# Patient Record
Sex: MALE | Race: WHITE | NOT HISPANIC OR LATINO | Employment: OTHER | URBAN - METROPOLITAN AREA
[De-identification: names, ages, dates, MRNs, and addresses within clinical notes are randomized per-mention and may not be internally consistent; named-entity substitution may affect disease eponyms.]

---

## 2024-01-23 ENCOUNTER — TRANSCRIBE ORDERS (OUTPATIENT)
Dept: PHYSICAL THERAPY | Facility: CLINIC | Age: 69
End: 2024-01-23
Payer: MEDICARE

## 2024-01-23 DIAGNOSIS — S93.401A SPRAIN OF RIGHT ANKLE, INITIAL ENCOUNTER: Primary | ICD-10-CM

## 2024-01-26 ENCOUNTER — TREATMENT (OUTPATIENT)
Dept: PHYSICAL THERAPY | Facility: CLINIC | Age: 69
End: 2024-01-26
Payer: MEDICARE

## 2024-01-26 DIAGNOSIS — S93.401A SPRAIN OF RIGHT ANKLE, UNSPECIFIED LIGAMENT, INITIAL ENCOUNTER: Primary | ICD-10-CM

## 2024-01-26 DIAGNOSIS — R26.9 ABNORMAL GAIT: ICD-10-CM

## 2024-01-26 DIAGNOSIS — R26.2 DIFFICULTY WALKING: ICD-10-CM

## 2024-01-26 DIAGNOSIS — Z78.9 DIFFICULTY NAVIGATING STAIRS: ICD-10-CM

## 2024-01-26 DIAGNOSIS — R29.898 LOSS OF MOVEMENT: ICD-10-CM
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
## 2024-02-02 ENCOUNTER — TREATMENT (OUTPATIENT)
Dept: PHYSICAL THERAPY | Facility: CLINIC | Age: 69
End: 2024-02-02
Payer: MEDICARE

## 2024-02-02 DIAGNOSIS — R26.9 ABNORMAL GAIT: ICD-10-CM

## 2024-02-02 DIAGNOSIS — Z78.9 DIFFICULTY NAVIGATING STAIRS: ICD-10-CM

## 2024-02-02 DIAGNOSIS — S93.401A SPRAIN OF RIGHT ANKLE, UNSPECIFIED LIGAMENT, INITIAL ENCOUNTER: Primary | ICD-10-CM

## 2024-02-02 DIAGNOSIS — R26.2 DIFFICULTY WALKING: ICD-10-CM

## 2024-02-02 DIAGNOSIS — R29.898 LOSS OF MOVEMENT: ICD-10-CM

## 2024-02-02 NOTE — PROGRESS NOTES
Physical Therapy Daily Treatment Note    [facility]  [address] [place] [address]  [phone] (phone)  [phone] (fax)    Patient: Catrachito Winkler   : 1955  Diagnosis/ICD-10 Code:  Sprain of right ankle, unspecified ligament, initial encounter [S93.401A]  Referring practitioner: NGOZI Conley  Today's Date: 2024  Patient seen for 2 sessions    Visit Diagnoses:    ICD-10-CM ICD-9-CM   1. Sprain of right ankle, unspecified ligament, initial encounter  S93.401A 845.00   2. Difficulty walking  R26.2 719.7   3. Loss of movement  R29.898 344.9   4. Abnormal gait  R26.9 781.2   5. Difficulty navigating stairs  Z78.9 V49.89              Subjective: Hao reports that he no longer has pain with his first step with walking after he has sat.  He notices just a trace of pain in the R ankle at the end of the day if he has been active on his feet.     Objective     Treatment    Therapeutic exercise  NuStep, seat at 9, work load of 5 x 5 minutes  Matrix leg press, seat at 8, work load of 15, 15 x 2, ROM 5-45 degrees of knee flexion    3. Matrix hip abduction, 40 lbs., 15 x 2  4. Standing unilateral wobble board, plantarflexion/dorsiflexion x 20, B  After manual therapy  5. Ankle plantarflexion/dorsiflexion, long sitting, x 20, B  6. Gastrocnemius stretch, long sitting, 30s x 3, B  7. Ankle plantarflexion PRE, long sitting, x 20, B    NMR: verbal cues for exercise included using eccentric control for each exercise.  With the wobble board to keep the heel flat on the board during end range of each movement and to avoid locking his knees with the leg press.  With the ankle plantarflexion PRE, to avoid inversion/eversion moments.     Manual therapy:  Talocrural joint mobilizations, grade 4, x 40 on the R and x 20 on the L    Self care: I added resisted ankle plantarflexion to his HEP, issuing blue theraband.     Assessment & Plan       Assessment  Assessment  details: Hao tolerated treatment well.  He required verbal cues for exercise technique and progressed his HEP.     Plan  Plan details: Continue per treatmentplan.                Timed:    Manual Therapy:    5     mins  78276;  Therapeutic Exercise:    30     mins  31503;     Neuromuscular Freddy:    10    mins  77925;    Therapeutic Activity:          mins  27807;     Gait Training:           mins  75677;     Ultrasound:          mins  89291;    Aquatic Therapy         mins 54888;  Self Care                            mins   28496        Untimed:  Electrical Stimulation:         mins  93720 ( );  Traction:         mins  44025;   Dry Needling  (1-2 muscles)                 mins 20560 (Self-pay)  Dry Needling (3-4 muscles)      20561 (Self-pay)  Dry Needling Trial         DRYNDLTRIAL  (No Charge)    Timed Treatment:   45   mins   Total Treatment:     45   mins    Nirva Hong PT  Physical Therapist    [place] License:[id number]

## 2024-02-06 ENCOUNTER — TREATMENT (OUTPATIENT)
Dept: PHYSICAL THERAPY | Facility: CLINIC | Age: 69
End: 2024-02-06
Payer: MEDICARE

## 2024-02-06 DIAGNOSIS — S93.401A SPRAIN OF RIGHT ANKLE, UNSPECIFIED LIGAMENT, INITIAL ENCOUNTER: Primary | ICD-10-CM

## 2024-02-06 DIAGNOSIS — R26.2 DIFFICULTY WALKING: ICD-10-CM

## 2024-02-06 DIAGNOSIS — R26.9 ABNORMAL GAIT: ICD-10-CM

## 2024-02-06 DIAGNOSIS — R29.898 LOSS OF MOVEMENT: ICD-10-CM

## 2024-02-06 DIAGNOSIS — Z78.9 DIFFICULTY NAVIGATING STAIRS: ICD-10-CM

## 2024-02-06 NOTE — PROGRESS NOTES
Physical Therapy Daily Treatment Note    [facility] [place] [address]  [phone] (phone)  [phone] (fax)    Patient: Catrachito Winkler   : 1955  Diagnosis/ICD-10 Code:  Sprain of right ankle, unspecified ligament, initial encounter [S93.401A]  Referring practitioner: NGOZI Conley  Today's Date: 2024  Patient seen for 3 sessions    Visit Diagnoses:    ICD-10-CM ICD-9-CM   1. Sprain of right ankle, unspecified ligament, initial encounter  S93.401A 845.00   2. Difficulty walking  R26.2 719.7   3. Loss of movement  R29.898 344.9   4. Abnormal gait  R26.9 781.2   5. Difficulty navigating stairs  Z78.9 V49.89              Subjective: Hao reports no ankle pain in his ankles recently, but with motion the R ankle does pop.    Objective     Treatment  Therapeutic exercise  NuStep, seat at 9, work load of 6 x 5 minutes  Wobble board, unilateral, plantarflexion/dorsiflexion, x 20, each leg  Wobble board, unilateral, inversion/eversion, x 20, each leg  Resisted gait, reverse/forward, 12.5 lbs., x 10, with CGA-1  Matrix leg press, seat at 8, 60 and 65 lbs., each x 15  Matrix hip abduction, 42.5 lbs., 15 x 2  Ankle plantarflexion, PRE, long sitting, blue theraband, x 20, B  Gastrocnemius stretch, long sitting, using towel, 30s x 3, B    NMR: verbal cues for exercise included technique on the wobble boards, with the resisted gait to use a toe to heel technique in reverse, heel to toe technique going forward, on the Matrix machines to use eccentric control with each machine and with the press to avoid full knee extension and during flexion to abduct his hips.  I reviewed technique with the ankle plantarflexion PRE and the gastrocnemius stretch.    Assessment & Plan       Assessment  Assessment details: Hao progressed exercises with the resisted gait.  He needed cues for exercise technique and some guarding with the resisted gait.  Hao tolerated  treatment well.     Plan  Plan details: Continue per treatment plan.                Timed:    Manual Therapy:         mins  95289;  Therapeutic Exercise:    37     mins  40096;     Neuromuscular Freddy:    8    mins  19565;    Therapeutic Activity:          mins  84570;     Gait Training:           mins  14331;     Ultrasound:          mins  89662;    Aquatic Therapy         mins 21744;  Self Care                            mins   04725        Untimed:  Electrical Stimulation:         mins  34169 ( );  Traction:         mins  65374;   Dry Needling  (1-2 muscles)                 mins 20560 (Self-pay)  Dry Needling (3-4 muscles)      20561 (Self-pay)  Dry Needling Trial         DRYNDLTRIAL  (No Charge)    Timed Treatment:   45   mins   Total Treatment:     45   mins    Nirav Hong PT  Physical Therapist    [place] License:[id number]

## 2024-02-13 ENCOUNTER — TREATMENT (OUTPATIENT)
Dept: PHYSICAL THERAPY | Facility: CLINIC | Age: 69
End: 2024-02-13
Payer: MEDICARE

## 2024-02-13 DIAGNOSIS — R29.898 LOSS OF MOVEMENT: ICD-10-CM

## 2024-02-13 DIAGNOSIS — R26.2 DIFFICULTY WALKING: ICD-10-CM

## 2024-02-13 DIAGNOSIS — Z78.9 DIFFICULTY NAVIGATING STAIRS: ICD-10-CM

## 2024-02-13 DIAGNOSIS — R26.9 ABNORMAL GAIT: ICD-10-CM

## 2024-02-13 DIAGNOSIS — S93.401A SPRAIN OF RIGHT ANKLE, UNSPECIFIED LIGAMENT, INITIAL ENCOUNTER: Primary | ICD-10-CM

## 2024-02-15 ENCOUNTER — TREATMENT (OUTPATIENT)
Dept: PHYSICAL THERAPY | Facility: CLINIC | Age: 69
End: 2024-02-15
Payer: MEDICARE

## 2024-02-15 DIAGNOSIS — R26.2 DIFFICULTY WALKING: ICD-10-CM

## 2024-02-15 DIAGNOSIS — Z78.9 DIFFICULTY NAVIGATING STAIRS: ICD-10-CM

## 2024-02-15 DIAGNOSIS — S93.401A SPRAIN OF RIGHT ANKLE, UNSPECIFIED LIGAMENT, INITIAL ENCOUNTER: Primary | ICD-10-CM

## 2024-02-15 DIAGNOSIS — R29.898 LOSS OF MOVEMENT: ICD-10-CM

## 2024-02-15 DIAGNOSIS — R26.9 ABNORMAL GAIT: ICD-10-CM

## 2024-03-04 ENCOUNTER — TREATMENT (OUTPATIENT)
Dept: PHYSICAL THERAPY | Facility: CLINIC | Age: 69
End: 2024-03-04
Payer: MEDICARE

## 2024-03-04 DIAGNOSIS — R26.2 DIFFICULTY WALKING: ICD-10-CM

## 2024-03-04 DIAGNOSIS — R26.9 ABNORMAL GAIT: ICD-10-CM

## 2024-03-04 DIAGNOSIS — R29.898 LOSS OF MOVEMENT: ICD-10-CM

## 2024-03-04 DIAGNOSIS — Z78.9 DIFFICULTY NAVIGATING STAIRS: ICD-10-CM

## 2024-03-04 DIAGNOSIS — S93.401A SPRAIN OF RIGHT ANKLE, UNSPECIFIED LIGAMENT, INITIAL ENCOUNTER: Primary | ICD-10-CM

## 2024-03-04 NOTE — PROGRESS NOTES
"Physical Therapy Daily Treatment/Reassessment Note    [facility]  [address] [place] [address]  [phone] (phone)  [phone] (fax)    Patient: Catrachito Winkler   : 1955  Diagnosis/ICD-10 Code:  Sprain of right ankle, unspecified ligament, initial encounter [S93.401A]  Referring practitioner: NGOZI Conley  Today's Date: 3/4/2024  Patient seen for 6 sessions    Visit Diagnoses:    ICD-10-CM ICD-9-CM   1. Sprain of right ankle, unspecified ligament, initial encounter  S93.401A 845.00   2. Loss of movement  R29.898 344.9   3. Difficulty walking  R26.2 719.7   4. Difficulty navigating stairs  Z78.9 V49.89   5. Abnormal gait  R26.9 781.2              Subjective:  Hao stated that his R ankle has not been experiencing pain.  He has been doing little exercise while on vacation the last 10 days.     Objective     Reassessment    Supine AROM of the ankles  Dorsiflexion: L 3 degrees, R 8 degrees  Plantarflexion: L 35 degrees, R 34 degrees  Inversion: L  8 degrees, R 16 degrrees  Eversion: R 4 degrees, R 8 degrees    MMT  Dorsiflexion L 3-/5, R 5/5   2. Plantarflexion:  unable to do a single leg heel raise but able to do bilateral partial repetitions x 10    Treatment    Therapeutic exercise  NuStep, seat at 11, work load of 6, x 5 minutes  Ankle wobble board for plantarflexion/dorsiflexion x 20, B, holding onto cage with one hand  Matrix leg press, seat at 8, 70 lbs., 15 x 2  Runner's step, x 10, B, 4\", step using column for balance    NMR: verbal cues for each exercise were given.   Assessment & Plan       Assessment  Impairments: abnormal gait, activity intolerance, impaired physical strength and lacks appropriate home exercise program   Other impairment: previous L L5 nerve root injury due to surgery in past to the lumbar spine  Functional limitations: walking and stooping   Assessment details: STGs to be met in 4 weeks  1. Hao begins NuStep to promote " improve movement in the hips, knees and ankles.(Met)  2. PREs to improve ankle strength are begun using theraband. (Met)  3. Gait training, focusing on a heel to toe pattern, is begun. (Met)     LTGs to be met in 12 weeks  1. B ankle dorsiflexion increases 5 degrees.(Ongoing)  2. Hao is able to navigate stairs, using hand rail and SC, with a step through technique, independently. (Ongoing)  3. He is independent with a HEP and self care. (Ongoing)         Plan  Therapy options: will be seen for skilled therapy services  Planned therapy interventions: manual therapy, neuromuscular re-education, therapeutic activities, stretching, strengthening, flexibility, functional ROM exercises, gait training, home exercise program and joint mobilization  Frequency: 2x week  Duration in weeks: 4  Treatment plan discussed with: patient  Plan details: Continue per treatment plan getting him ready for independent self care.                Timed:    Manual Therapy:         mins  71513;  Therapeutic Exercise:    37     mins  51009;     Neuromuscular Freddy:    8    mins  09773;    Therapeutic Activity:          mins  45602;     Gait Training:           mins  35866;     Ultrasound:          mins  93909;    Aquatic Therapy         mins 98103;  Self Care                            mins   22549        Untimed:  Electrical Stimulation:         mins  27038 ( );  Traction:         mins  17678;   Dry Needling  (1-2 muscles)                 mins 20560 (Self-pay)  Dry Needling (3-4 muscles)      20561 (Self-pay)  Dry Needling Trial         DRYNDLTRIAL  (No Charge)    Timed Treatment:   45   mins   Total Treatment:     45   mins    Nirav Hong PT  Physical Therapist    [place] License:[id number]

## 2024-03-07 ENCOUNTER — TREATMENT (OUTPATIENT)
Dept: PHYSICAL THERAPY | Facility: CLINIC | Age: 69
End: 2024-03-07
Payer: MEDICARE

## 2024-03-07 DIAGNOSIS — R29.898 LOSS OF MOVEMENT: ICD-10-CM

## 2024-03-07 DIAGNOSIS — R26.9 ABNORMAL GAIT: ICD-10-CM

## 2024-03-07 DIAGNOSIS — Z78.9 DIFFICULTY NAVIGATING STAIRS: ICD-10-CM

## 2024-03-07 DIAGNOSIS — R26.2 DIFFICULTY WALKING: ICD-10-CM

## 2024-03-07 DIAGNOSIS — S93.401A SPRAIN OF RIGHT ANKLE, UNSPECIFIED LIGAMENT, INITIAL ENCOUNTER: Primary | ICD-10-CM

## 2024-03-07 NOTE — PROGRESS NOTES
"Physical Therapy Daily Treatment Note    [facility]  [address] [place] [address]  [phone] (phone)  [phone] (fax)    Patient: Catrachito Winkler   : 1955  Diagnosis/ICD-10 Code:  Sprain of right ankle, unspecified ligament, initial encounter [S93.401A]  Referring practitioner: NGOZI Conley  Today's Date: 3/7/2024  Patient seen for 7 sessions    Visit Diagnoses:    ICD-10-CM ICD-9-CM   1. Sprain of right ankle, unspecified ligament, initial encounter  S93.401A 845.00   2. Loss of movement  R29.898 344.9   3. Difficulty walking  R26.2 719.7   4. Difficulty navigating stairs  Z78.9 V49.89   5. Abnormal gait  R26.9 781.2              Subjective: Hao reported that he did well after last session and was able to do yard work afterwards.      Objective     Treatment  Therapeutic exercise  NuStep, seat at 9, work load of 6 x 5 minutes  Matrix leg press, seat at 8, 60 and 65 lbs., each x 15  Matrix hip abduction, 42.5 lbs., 15 x 2  Resisted gait, reverse/forward, 12.5 lbs., holding onto bar,x 10, with CGA-1  Runner's step, 4\", x 10, B, using column for balance assist  Lateral step down, 4\", x 10, B, using column for balance assist  Wobble board plantarflexion/dorsiflexion x 20, B  Wobble board inversion/eversion 10 x 2 and balancing in center for 10s between each set of 10, holding onto bar.  Seated leg curl, seat at 6, 20 lbs., 10 x 2  9. Seated ankle dorsiflexion, L x 20, yellow theraband, R x 20, red theraband    NMR:  verbal cues for the exercises were given.  For the leg press, to avoid full knee extension and slightly abduct the hips into knee flexion, to use eccentric control with the hip abduction, for the resisted gait, to use a toe to heel gait in reverse, while taking smaller more deliberate steps, and a heel to toe gait forward.  With the step exercises to stand tall, gaze to the ground ahead to increase proprioceptive feedback to the brain " and help balance and to use eccentric control with the static leg and hit the dynamic foot toe to heel.  cues for the leg curl to avoid full knee extension and use eccentric control for the return.     Assessment & Plan       Assessment  Assessment details: I was able to add leg curl to his exercises to prepare him better for independent self care.  He required verbal cues for exercise to include the leg curl for technique.      Plan  Plan details: Hao has two more visits next week where I will finalize self care.                Timed:    Manual Therapy:         mins  10746;  Therapeutic Exercise:    35     mins  65947;     Neuromuscular Freddy:    8    mins  17820;    Therapeutic Activity:          mins  54316;     Gait Training:           mins  00864;     Ultrasound:          mins  08872;    Aquatic Therapy         mins 05837;  Self Care                            mins   56700        Untimed:  Electrical Stimulation:         mins  61407 ( );  Traction:         mins  56341;   Dry Needling  (1-2 muscles)                 mins 46568 (Self-pay)  Dry Needling (3-4 muscles)      20561 (Self-pay)  Dry Needling Trial         DRYNDLTRIAL  (No Charge)    Timed Treatment:   43   mins   Total Treatment:     43   mins    Nirav Hong PT  Physical Therapist    [place] License:[id number]

## 2024-03-11 ENCOUNTER — TREATMENT (OUTPATIENT)
Dept: PHYSICAL THERAPY | Facility: CLINIC | Age: 69
End: 2024-03-11
Payer: MEDICARE

## 2024-03-11 DIAGNOSIS — R26.2 DIFFICULTY WALKING: ICD-10-CM

## 2024-03-11 DIAGNOSIS — R26.9 ABNORMAL GAIT: ICD-10-CM

## 2024-03-11 DIAGNOSIS — Z78.9 DIFFICULTY NAVIGATING STAIRS: ICD-10-CM

## 2024-03-11 DIAGNOSIS — S93.401A SPRAIN OF RIGHT ANKLE, UNSPECIFIED LIGAMENT, INITIAL ENCOUNTER: Primary | ICD-10-CM

## 2024-03-11 DIAGNOSIS — R29.898 LOSS OF MOVEMENT: ICD-10-CM

## 2024-03-11 NOTE — PROGRESS NOTES
"Physical Therapy Daily Treatment Note    [facility]  [address] [place] [address]  [phone] (phone)  [phone] (fax)    Patient: Catrachito Winkler   : 1955  Diagnosis/ICD-10 Code:  Sprain of right ankle, unspecified ligament, initial encounter [S93.401A]  Referring practitioner: NGOZI Conley  Today's Date: 3/11/2024  Patient seen for 8 sessions    Visit Diagnoses:    ICD-10-CM ICD-9-CM   1. Sprain of right ankle, unspecified ligament, initial encounter  S93.401A 845.00   2. Loss of movement  R29.898 344.9   3. Difficulty walking  R26.2 719.7   4. Difficulty navigating stairs  Z78.9 V49.89   5. Abnormal gait  R26.9 781.2              Subjective:     Objective     Treatment  Therapeutic exercise  NuStep, seat at 9, work load of 6 x 5 minutes  Matrix leg press, seat at 8, 60 and 65 lbs., each x 15  Matrix hip abduction, 42.5 lbs., 15 x 2  Resisted gait, reverse/forward, 12.5 lbs., holding onto bar,x 10, with CGA-1  Runner's step, 4\", x 10, B, using column for balance assist  Lateral step down, 4\", x 10, B, using column for balance assist  Wobble board plantarflexion/dorsiflexion x 20, B  Wobble board inversion/eversion 10 x 2 and balancing in center for 10s between each set of 10, holding onto bar.  Seated leg curl, seat at 6, 20 lbs., 10 x 2  9. Seated ankle dorsiflexion, L x 20, yellow theraband, R x 20, red theraband     NMR: verbal cues for exercise technique were given.     Assessment & Plan       Assessment  Assessment details: Hao required verbal cues for exercise technique.  He has limited motion with the dorsiflexion on his L leg but there is slight movement with the yellow theraband,     Plan  Plan details: To see one more visit and give final instructions.                Timed:    Manual Therapy:         mins  25446;  Therapeutic Exercise:    37     mins  80661;     Neuromuscular Freddy:    8    mins  95016;    Therapeutic Activity:        "   mins  52261;     Gait Training:           mins  81042;     Ultrasound:          mins  67825;    Aquatic Therapy         mins 05145;  Self Care                            mins   80729        Untimed:  Electrical Stimulation:         mins  82998 ( );  Traction:         mins  01969;   Dry Needling  (1-2 muscles)                 mins 20560 (Self-pay)  Dry Needling (3-4 muscles)      20561 (Self-pay)  Dry Needling Trial         DRYNDLTRIAL  (No Charge)    Timed Treatment:   45   mins   Total Treatment:     45   mins    Nirav Hong PT  Physical Therapist    [place] License:[id number]

## 2024-03-14 ENCOUNTER — TREATMENT (OUTPATIENT)
Dept: PHYSICAL THERAPY | Facility: CLINIC | Age: 69
End: 2024-03-14
Payer: MEDICARE

## 2024-03-14 DIAGNOSIS — R26.9 ABNORMAL GAIT: ICD-10-CM

## 2024-03-14 DIAGNOSIS — S93.401A SPRAIN OF RIGHT ANKLE, UNSPECIFIED LIGAMENT, INITIAL ENCOUNTER: Primary | ICD-10-CM

## 2024-03-14 DIAGNOSIS — Z78.9 DIFFICULTY NAVIGATING STAIRS: ICD-10-CM

## 2024-03-14 DIAGNOSIS — R29.898 LOSS OF MOVEMENT: ICD-10-CM

## 2024-03-14 DIAGNOSIS — R26.2 DIFFICULTY WALKING: ICD-10-CM
